# Patient Record
Sex: FEMALE | Race: BLACK OR AFRICAN AMERICAN
[De-identification: names, ages, dates, MRNs, and addresses within clinical notes are randomized per-mention and may not be internally consistent; named-entity substitution may affect disease eponyms.]

---

## 2018-03-05 ENCOUNTER — HOSPITAL ENCOUNTER (EMERGENCY)
Dept: HOSPITAL 17 - NEPK | Age: 18
Discharge: HOME | End: 2018-03-05
Payer: SELF-PAY

## 2018-03-05 VITALS
OXYGEN SATURATION: 100 % | SYSTOLIC BLOOD PRESSURE: 130 MMHG | RESPIRATION RATE: 12 BRPM | TEMPERATURE: 98.3 F | DIASTOLIC BLOOD PRESSURE: 79 MMHG | HEART RATE: 80 BPM
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
## 2018-03-05 DIAGNOSIS — M25.461: ICD-10-CM

## 2018-03-05 DIAGNOSIS — S83.91XA: Primary | ICD-10-CM
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
## 2018-03-05 PROCEDURE — L1830 KO IMMOB CANVAS LONG PRE OTS: HCPCS

## 2018-03-05 PROCEDURE — 73564 X-RAY EXAM KNEE 4 OR MORE: CPT

## 2018-03-05 PROCEDURE — 99283 EMERGENCY DEPT VISIT LOW MDM: CPT

## 2018-03-05 PROCEDURE — E0113 CRUTCH UNDERARM EACH WOOD: HCPCS

## 2018-03-05 NOTE — PD
HPI


Chief Complaint:  Injury


Time Seen by Provider:  14:05


Travel History


International Travel<30 days:  No


Contact w/Intl Traveler<30days:  No


Traveled to known affect area:  No





History of Present Illness


HPI


This is a 17-year-old female here with injury to the right knee caused by a 

twist and fall injury last night.  She has difficulty flexing the knee and 

weightbearing.  She denies paresthesia or weakness of the extremity.  Symptom 

severity is moderate.  Aggravated by weightbearing and flexion of the knee.  

Relieved with rest.





PFSH


Past Medical History


Medical History:  Denies Significant Hx


Diminished Hearing:  No


Tetanus Vaccination:  < 5 Years


Influenza Vaccination:  No


Pregnant?:  Not Pregnant


LMP:  2/28/2018





Past Surgical History


Surgical History:  No Previous Surgery





Social History


Alcohol Use:  No


Tobacco Use:  No


Substance Use:  No





Allergies-Medications


(Allergen,Severity, Reaction):  


Coded Allergies:  


     No Known Allergies (Unverified , 3/5/18)


Reported Meds & Prescriptions





Reported Meds & Active Scripts


Active


Reported


Levora-28 (Levonorgestrel-Ethinyl Estradiol) 0.15-0.03 Mg Tab 1 Tab PO DAILY








Review of Systems


Except as stated in HPI:  all other systems reviewed are Neg





Physical Exam


Narrative


GENERAL: 17-year-old female who is well-appearing


SKIN: Warm and dry.


HEAD: Normocephalic.  Atraumatic


EYES:  No injection or drainage. 


NECK: Supple


MUSCULOSKELETAL: No cyanosis.  Left lower extremity: Mild tenderness and 

swelling to the anterior aspect of left knee.  Mild joint effusion.  Joint is 

stable.  No laxity.  Pain with flexion.  2+ distal pulses.  Normal sensation.  

Brisk cap refill.











Data


Data


Last Documented VS





Vital Signs








  Date Time  Temp Pulse Resp B/P (MAP) Pulse Ox O2 Delivery O2 Flow Rate FiO2


 


3/5/18 11:55 98.3 80 12 130/79 (96) 100   








Orders





 Orders


Knee, Complete (4vws) (3/5/18 )


^ Knee Immobilizer (3/5/18 14:12)


Crutches (3/5/18 14:12)


Immobilizer Knee 20 Inch (3/5/18 )








MDM


Medical Decision Making


Medical Screen Exam Complete:  Yes


Emergency Medical Condition:  Yes


Differential Diagnosis


Knee sprain, knee fracture, MCL/ACL injury


Narrative Course


This is a 17-year-old female here with injury to the right knee caused by a 

twist and fall injury last night.  She has difficulty flexing the knee and 

weightbearing.  The extremity is neurovascular intact.  X-ray is negative for 

fracture.  At that show a joint effusion.  Knee was placed in an immobilizer 

wretches.  She is to ice and elevate the extremity.  Follow-up with orthopedic 

doctor.





Diagnosis





 Primary Impression:  


 Knee sprain


 Qualified Codes:  S83.91XA - Sprain of unspecified site of right knee, initial 

encounter


Referrals:  


Primary Care Physician





***Additional Instructions:  


Ice and elevate the extremity


Use the immobilizer as directed


Follow-up with orthopedic doctor


Disposition:  01 DISCHARGE HOME


Condition:  Stable











Celine Moreau Mar 5, 2018 14:45

## 2018-03-05 NOTE — RADRPT
EXAM DATE/TIME:  03/05/2018 12:25 

 

[facility] COMPARISON:     

No previous studies available for comparison.

 

                     

INDICATIONS :     

Right knee pain after twisting knee dancing.

                     

 

MEDICAL HISTORY :     

None.          

 

SURGICAL HISTORY :     

None.   

 

ENCOUNTER:     

Initial                                        

 

ACUITY:     

1 day      

 

PAIN SCORE:     

10/10

 

LOCATION:     

Right  knee

 

FINDINGS:     

Four view examination of the right knee demonstrates no evidence of fracture or dislocation.  Bony mi
neralization is normal.  The articular surfaces are intact. Significant increased density is identifi
ed in the suprapatellar bursa.

 

CONCLUSION:     

1. Increased density in the suprapatellar bursa characteristic of a moderate effusion

2. No evidence of acute fracture.

 

 

 

 Trace Aguirre MD on March 05, 2018 at 12:44           

Board Certified Radiologist.

 This report was verified electronically.

## 2018-03-13 ENCOUNTER — HOSPITAL ENCOUNTER (EMERGENCY)
Dept: HOSPITAL 17 - NEPK | Age: 18
Discharge: HOME | End: 2018-03-13
Payer: COMMERCIAL

## 2018-03-13 VITALS
TEMPERATURE: 98.9 F | OXYGEN SATURATION: 100 % | HEART RATE: 83 BPM | RESPIRATION RATE: 16 BRPM | SYSTOLIC BLOOD PRESSURE: 129 MMHG | DIASTOLIC BLOOD PRESSURE: 76 MMHG

## 2018-03-13 VITALS — HEIGHT: 65 IN | BODY MASS INDEX: 22.04 KG/M2 | WEIGHT: 132.28 LBS

## 2018-03-13 DIAGNOSIS — M79.604: Primary | ICD-10-CM

## 2018-03-13 PROCEDURE — 99282 EMERGENCY DEPT VISIT SF MDM: CPT

## 2018-03-13 NOTE — PD
HPI


Chief Complaint:  Pain: Acute or Chronic


Time Seen by Provider:  11:23


Travel History


International Travel<30 days:  No


Contact w/Intl Traveler<30days:  No


Traveled to known affect area:  No





History of Present Illness


HPI


17-year-old female here with right leg pain.  Her mom was concerned this was a 

blood clot and therefore encouraged her to come in for evaluation.  She was 

recently seen on 3518 and diagnosed with a knee sprain in the same leg.  He has 

been wearing a knee immobilizer.  She reports the pain is in the posterior calf 

and only present when she stands.  It is mild in nature.  She denies any leg 

swelling.  She takes birth control.  No recent surgery.  No recent 

immobilization.  No history of cancer.





PFSH


Past Medical History


Medical History:  Denies Significant Hx


Diminished Hearing:  No


Pregnant?:  Not Pregnant


LMP:  03/01/18





Social History


Alcohol Use:  No


Tobacco Use:  No


Substance Use:  No





Allergies-Medications


(Allergen,Severity, Reaction):  


Coded Allergies:  


     No Known Allergies (Unverified , 3/13/18)


Reported Meds & Prescriptions





Reported Meds & Active Scripts


Active


Reported


Levora-28 (Levonorgestrel-Ethinyl Estradiol) 0.15-0.03 Mg Tab 1 Tab PO DAILY








Review of Systems


Except as stated in HPI:  all other systems reviewed are Neg


General / Constitutional:  No: Fever


Eyes:  No: Visual changes


HENT:  No: Headaches


Cardiovascular:  No: Chest Pain or Discomfort


Respiratory:  No: Shortness of Breath


Gastrointestinal:  No: Abdominal Pain


Genitourinary:  No: Dysuria





Physical Exam


Narrative


GENERAL: Alert and well-appearing 17-year-old female


SKIN: Warm and dry.


HEAD: Normocephalic.


EYES:  No injection or drainage. 


NECK: Supple, trachea midline. No JVD or lymphadenopathy.


CARDIOVASCULAR: Regular rate and rhythm 


RESPIRATORY: Breath sounds equal bilaterally. No accessory muscle use.


GASTROINTESTINAL: Abdomen soft, non-tender, nondistended. 


MUSCULOSKELETAL: No cyanosis, or edema.  Right lower extremity: Right knee with 

Moderate size joint effusion.  No posterior knee or calf tenderness.  No lower 

extremity edema.  Negative Homans sign.  2+ DP pulse.  Brisk cap refill.  

Extremities are equal size without swelling.  No warmth or erythema noted.











Data


Data


Last Documented VS





Vital Signs








  Date Time  Temp Pulse Resp B/P (MAP) Pulse Ox O2 Delivery O2 Flow Rate FiO2


 


3/13/18 10:40 98.9 83 16 129/76 (93) 100   











MDM


Medical Decision Making


Medical Screen Exam Complete:  Yes


Emergency Medical Condition:  Yes


Differential Diagnosis


Right knee pain due to known strain, DVT unlikely, other


Narrative Course


This is a 17-year-old female here for evaluation of mild right leg pain.  Her 

mother was concerned that this may be a DVT.  She has no prior history of DVTs.

  Her extremities are equal size without swelling.  Her extremity is nontender.

  Negative Homans sign.  I have very little clinical suspicion of DVT.  This 

was discussed at length with patient.  She is to follow-up with her primary 

doctor for recheck.  She will also be referred to or so for follow-up for knee 

sprain.





Diagnosis





 Primary Impression:  


 Leg pain


 Qualified Codes:  M79.604 - Pain in right leg


Referrals:  


Cj Aaron MD





Orthopedist





Primary Care Physician





***Additional Instructions:  


Call to schedule a follow-up appointment with orthopedic doctor regarding her 

knee sprain.


 Tylenol and ibuprofen for knee pain.


Return to emergency department if he developed new or worsening symptoms.


Disposition:  01 DISCHARGE HOME


Condition:  Stable











Celine Moreau Mar 13, 2018 11:36